# Patient Record
Sex: MALE | Race: WHITE | NOT HISPANIC OR LATINO | URBAN - METROPOLITAN AREA
[De-identification: names, ages, dates, MRNs, and addresses within clinical notes are randomized per-mention and may not be internally consistent; named-entity substitution may affect disease eponyms.]

---

## 2022-04-06 ENCOUNTER — WEB ENCOUNTER (OUTPATIENT)
Dept: URBAN - METROPOLITAN AREA CLINIC 111 | Facility: CLINIC | Age: 57
End: 2022-04-06

## 2022-04-12 ENCOUNTER — OFFICE VISIT (OUTPATIENT)
Dept: URBAN - METROPOLITAN AREA CLINIC 111 | Facility: CLINIC | Age: 57
End: 2022-04-12
Payer: COMMERCIAL

## 2022-04-12 ENCOUNTER — DASHBOARD ENCOUNTERS (OUTPATIENT)
Age: 57
End: 2022-04-12

## 2022-04-12 VITALS
BODY MASS INDEX: 33.1 KG/M2 | SYSTOLIC BLOOD PRESSURE: 126 MMHG | TEMPERATURE: 98.4 F | HEIGHT: 76 IN | HEART RATE: 64 BPM | DIASTOLIC BLOOD PRESSURE: 68 MMHG | WEIGHT: 271.8 LBS

## 2022-04-12 DIAGNOSIS — K44.9 HIATAL HERNIA: ICD-10-CM

## 2022-04-12 DIAGNOSIS — K21.9 GERD WITHOUT ESOPHAGITIS: ICD-10-CM

## 2022-04-12 DIAGNOSIS — R13.19 ESOPHAGEAL DYSPHAGIA: ICD-10-CM

## 2022-04-12 PROCEDURE — 99203 OFFICE O/P NEW LOW 30 MIN: CPT | Performed by: INTERNAL MEDICINE

## 2022-04-12 NOTE — HPI-TODAY'S VISIT:
58 yo male with long history of acid reflux was referred by Dr. Elie Mott at [facility]  [facility] for EGD. A copy of this document will be sent to the physician. Pt has long history of GERD, reports acid reflux is getting worse and not controlled with antacid diet and medications.  He takes Nexium daily and tums prn. Pt also has difficulty swallowing for many years. He had a partial glossectomy and wonders if it could be related to dysphagia. He has hx of lap saleem in 2020. Pt saw Dr. Mott in March for any surgical recommendation for his worsening acid reflux.  Barium swallow study revealed small hital hernia in 12/2021.  Denies any abd pain, nausea, vomiting, melena, hematochezia or weight loos. Last egd/colonoscopy about 5 years ago were normal per patient. No prior difficulties with anesthesia or egd/colonoscopy. Not on blood thinners, denies heart or lung diseases. Hypertenstion controlled with medication.

## 2022-04-12 NOTE — PHYSICAL EXAM HENT:
head is normocephalic, atramatic. Face within normal limits, external ears within normal limits. patient wearing mask in situ.

## 2022-04-12 NOTE — PHYSICAL EXAM CONSTITUTIONAL:
well developed and nourished, in no acute distress, normal communication ability, ambulating without difficulty.

## 2022-04-24 ENCOUNTER — TELEPHONE ENCOUNTER (OUTPATIENT)
Dept: URBAN - METROPOLITAN AREA CLINIC 92 | Facility: CLINIC | Age: 57
End: 2022-04-24

## 2022-05-23 PROBLEM — 266435005: Status: ACTIVE | Noted: 2022-04-12

## 2022-05-31 ENCOUNTER — OFFICE VISIT (OUTPATIENT)
Dept: URBAN - METROPOLITAN AREA LAB 3 | Facility: LAB | Age: 57
End: 2022-05-31

## 2022-06-07 ENCOUNTER — OFFICE VISIT (OUTPATIENT)
Dept: URBAN - METROPOLITAN AREA LAB 3 | Facility: LAB | Age: 57
End: 2022-06-07
Payer: COMMERCIAL

## 2022-06-07 DIAGNOSIS — D12.0 ADENOMA OF CECUM: ICD-10-CM

## 2022-06-07 DIAGNOSIS — K22.89 DILATION OF ESOPHAGUS: ICD-10-CM

## 2022-06-07 DIAGNOSIS — R13.19 CERVICAL DYSPHAGIA: ICD-10-CM

## 2022-06-07 DIAGNOSIS — Z12.11 COLON CANCER SCREENING: ICD-10-CM

## 2022-06-07 DIAGNOSIS — D12.3 ADENOMA OF TRANSVERSE COLON: ICD-10-CM

## 2022-06-07 PROCEDURE — 43239 EGD BIOPSY SINGLE/MULTIPLE: CPT | Performed by: INTERNAL MEDICINE

## 2022-06-07 PROCEDURE — 45380 COLONOSCOPY AND BIOPSY: CPT | Performed by: INTERNAL MEDICINE
